# Patient Record
Sex: MALE | Race: WHITE | NOT HISPANIC OR LATINO | URBAN - METROPOLITAN AREA
[De-identification: names, ages, dates, MRNs, and addresses within clinical notes are randomized per-mention and may not be internally consistent; named-entity substitution may affect disease eponyms.]

---

## 2018-08-27 ENCOUNTER — OFFICE VISIT (OUTPATIENT)
Dept: URBAN - METROPOLITAN AREA CLINIC 23 | Facility: CLINIC | Age: 73
End: 2018-08-27
Payer: MEDICARE

## 2018-08-27 DIAGNOSIS — B02.39 OTHER HERPES ZOSTER EYE DISEASE: Primary | Chronic | ICD-10-CM

## 2018-08-27 DIAGNOSIS — H25.13 AGE-RELATED NUCLEAR CATARACT, BILATERAL: Chronic | ICD-10-CM

## 2018-08-27 PROCEDURE — 99213 OFFICE O/P EST LOW 20 MIN: CPT | Performed by: OPTOMETRIST

## 2018-08-27 ASSESSMENT — INTRAOCULAR PRESSURE
OD: 10
OS: 14

## 2018-08-27 NOTE — IMPRESSION/PLAN
Impression: Other herpes zoster eye disease: B02.39. Plan: Continue valacyclovir as prescribed by PCP; discussed new shingles vaccination; discussed if any pain on left eye to call and RTC to clinic immediately. Not likely as patient has been on oral antivirals for almost a week now, last day of treatment is tomorrow.

## 2020-05-06 ENCOUNTER — OFFICE VISIT (OUTPATIENT)
Dept: URBAN - METROPOLITAN AREA CLINIC 23 | Facility: CLINIC | Age: 75
End: 2020-05-06
Payer: MEDICARE

## 2020-05-06 DIAGNOSIS — H10.45 OTHER CHRONIC ALLERGIC CONJUNCTIVITIS: ICD-10-CM

## 2020-05-06 PROCEDURE — 92014 COMPRE OPH EXAM EST PT 1/>: CPT | Performed by: OPTOMETRIST

## 2020-05-06 ASSESSMENT — KERATOMETRY
OS: 44.13
OD: 44.13

## 2020-05-06 ASSESSMENT — VISUAL ACUITY
OD: 20/25
OS: 20/25

## 2020-05-06 ASSESSMENT — INTRAOCULAR PRESSURE
OD: 12
OS: 12

## 2020-05-06 NOTE — IMPRESSION/PLAN
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 Plan: Discussed findings. Recommend pt try Zaditor or Pataday OTC for allergies. Use AT during the day for comfort.

## 2020-05-06 NOTE — IMPRESSION/PLAN
Impression: Regular astigmatism, bilateral: H52.223. Plan: Discussed findings. New glasses Rx given. Recommend pt use cleaning cloth in glasses case to prevent scratches.

## 2020-05-06 NOTE — IMPRESSION/PLAN
Impression: Age-related nuclear cataract, bilateral: H25.13. Plan: Cataracts account for the patient's complaints. Medium. No treatment currently recommended. The patient will monitor vision changes and contact us with any decrease in vision. Recommend update glasses.

## 2021-06-29 ENCOUNTER — OFFICE VISIT (OUTPATIENT)
Dept: URBAN - METROPOLITAN AREA CLINIC 24 | Facility: CLINIC | Age: 76
End: 2021-06-29
Payer: MEDICARE

## 2021-06-29 DIAGNOSIS — H04.123 DRY EYE SYNDROME OF BILATERAL LACRIMAL GLANDS: Primary | ICD-10-CM

## 2021-06-29 DIAGNOSIS — H25.813 COMBINED FORMS OF AGE-RELATED CATARACT, BILATERAL: ICD-10-CM

## 2021-06-29 DIAGNOSIS — H52.223 REGULAR ASTIGMATISM, BILATERAL: ICD-10-CM

## 2021-06-29 PROCEDURE — 92134 CPTRZ OPH DX IMG PST SGM RTA: CPT | Performed by: OPTOMETRIST

## 2021-06-29 PROCEDURE — 92004 COMPRE OPH EXAM NEW PT 1/>: CPT | Performed by: OPTOMETRIST

## 2021-06-29 ASSESSMENT — VISUAL ACUITY
OS: 20/20
OD: 20/25

## 2021-06-29 ASSESSMENT — INTRAOCULAR PRESSURE
OD: 16
OS: 16

## 2021-06-29 ASSESSMENT — KERATOMETRY
OS: 44.41
OD: 44.73

## 2021-06-29 NOTE — IMPRESSION/PLAN
Impression: Dry eye syndrome of bilateral lacrimal glands, Blepharitis OU: W00.046. Plan: Explained condition to patient. Recommend warm compresses, lid scrubs, and artificial tears QID or more. Discussed benefits of Omega-3 FA's. Will monitor patient for now. RTC if no improvement or worsens.

## 2021-06-29 NOTE — IMPRESSION/PLAN
Impression: Regular astigmatism, bilateral: H52.223.  Plan: New srx released per pt request
Pt understands NI with new srx, NO RX CHECK

## 2022-04-28 ENCOUNTER — OFFICE VISIT (OUTPATIENT)
Dept: URBAN - METROPOLITAN AREA CLINIC 23 | Facility: CLINIC | Age: 77
End: 2022-04-28
Payer: MEDICARE

## 2022-04-28 DIAGNOSIS — H04.123 DRY EYE SYNDROME OF BILATERAL LACRIMAL GLANDS: Primary | ICD-10-CM

## 2022-04-28 DIAGNOSIS — H52.223 REGULAR ASTIGMATISM, BILATERAL: ICD-10-CM

## 2022-04-28 PROCEDURE — 99213 OFFICE O/P EST LOW 20 MIN: CPT | Performed by: STUDENT IN AN ORGANIZED HEALTH CARE EDUCATION/TRAINING PROGRAM

## 2022-04-28 ASSESSMENT — VISUAL ACUITY
OD: 20/25
OS: 20/20

## 2022-04-28 NOTE — IMPRESSION/PLAN
Impression: Dry eye syndrome of bilateral lacrimal glands H04.123. Blepharitis OU Allergic component Plan: Explained condition to patient. Recommend cont lid scrubs.  Increase art tears to qid OU and add OTC allergy gtts (rec pataday/zaditor)

## 2022-04-28 NOTE — IMPRESSION/PLAN
Impression: Regular astigmatism, bilateral: H52.223. Plan: Reassured pt rx made correctly and no changes in refraction today. 
Cont with current rx

## 2022-08-30 ENCOUNTER — OFFICE VISIT (OUTPATIENT)
Dept: URBAN - METROPOLITAN AREA CLINIC 23 | Facility: CLINIC | Age: 77
End: 2022-08-30
Payer: MEDICARE

## 2022-08-30 DIAGNOSIS — H04.221 EPIPHORA DUE TO INSUFFICIENT DRAINAGE, RIGHT LACRIMAL GLAND: Primary | ICD-10-CM

## 2022-08-30 PROCEDURE — 99214 OFFICE O/P EST MOD 30 MIN: CPT | Performed by: OPTOMETRIST

## 2022-08-30 RX ORDER — CIPROFLOXACIN HYDROCHLORIDE 3 MG/ML
0.3 % SOLUTION/ DROPS OPHTHALMIC
Qty: 5 | Refills: 0 | Status: ACTIVE
Start: 2022-08-30

## 2022-08-30 ASSESSMENT — INTRAOCULAR PRESSURE
OD: 14
OS: 14

## 2022-08-30 ASSESSMENT — KERATOMETRY
OD: 45.38
OS: 44.88

## 2022-08-30 NOTE — IMPRESSION/PLAN
Impression: Epiphora due to insufficient drainage, right lacrimal gland: X96.520. Right. Condition: will continue to monitor. Plan: Discussed findings. Advised patient of condition and treatment options. Prescribed Ciprofloxacin QID OD x 7 days. Recommend visit with oculoplastic specialist for possible dilation and irrigation.

## 2022-10-11 ENCOUNTER — OFFICE VISIT (OUTPATIENT)
Dept: URBAN - METROPOLITAN AREA CLINIC 23 | Facility: CLINIC | Age: 77
End: 2022-10-11
Payer: MEDICARE

## 2022-10-11 DIAGNOSIS — H04.123 DRY EYE SYNDROME OF BILATERAL LACRIMAL GLANDS: Primary | ICD-10-CM

## 2022-10-11 PROCEDURE — 99213 OFFICE O/P EST LOW 20 MIN: CPT | Performed by: OPTOMETRIST

## 2022-10-11 ASSESSMENT — INTRAOCULAR PRESSURE
OD: 18
OS: 18

## 2022-10-11 ASSESSMENT — KERATOMETRY
OS: 45.00
OD: 45.25

## 2022-10-11 NOTE — IMPRESSION/PLAN
Impression: Dry eye syndrome of bilateral lacrimal glands: H04.123. Bilateral. Condition: well controlled. Plan: Discussed findings. Epiphora resolved per pt. Continue use of OTC artificial tears BID OU for maintenance. Call with any worsening symptoms, return to annual exams.

## 2023-05-09 ENCOUNTER — OFFICE VISIT (OUTPATIENT)
Dept: URBAN - METROPOLITAN AREA CLINIC 23 | Facility: CLINIC | Age: 78
End: 2023-05-09
Payer: MEDICARE

## 2023-05-09 DIAGNOSIS — H04.223 EPIPHORA DUE TO INSUFFICIENT DRAINAGE, BILATERAL: ICD-10-CM

## 2023-05-09 DIAGNOSIS — H11.823 CONJUNCTIVOCHALASIS, BILATERAL: Primary | ICD-10-CM

## 2023-05-09 PROCEDURE — 99213 OFFICE O/P EST LOW 20 MIN: CPT | Performed by: OPTOMETRIST

## 2023-05-09 ASSESSMENT — INTRAOCULAR PRESSURE
OS: 13
OD: 14

## 2023-05-09 ASSESSMENT — KERATOMETRY
OS: 44.88
OD: 45.38

## 2023-05-09 NOTE — IMPRESSION/PLAN
Impression: Epiphora due to insufficient drainage, bilateral: U69.182. Bilateral. Condition: will continue to monitor. Plan: Consider possible dilation/irrigation of nasal-lacrimal duct if condition does not improve with use of Alrex.

## 2023-05-09 NOTE — IMPRESSION/PLAN
Impression: Conjunctivochalasis, bilateral: Q86.392. Bilateral. Condition: will continue to monitor. Plan: Discussed findings. Significant conjunctivochalasis noted. Gave sample of Alrex BID OU. Recommend consult with Dr. Chana Guthrie for possible treatment if condition does not improve.

## 2023-05-22 ENCOUNTER — OFFICE VISIT (OUTPATIENT)
Dept: URBAN - METROPOLITAN AREA CLINIC 23 | Facility: CLINIC | Age: 78
End: 2023-05-22
Payer: MEDICARE

## 2023-05-22 DIAGNOSIS — H11.823 CONJUNCTIVOCHALASIS, BILATERAL: Primary | ICD-10-CM

## 2023-05-22 PROCEDURE — 99203 OFFICE O/P NEW LOW 30 MIN: CPT | Performed by: OPHTHALMOLOGY

## 2023-05-22 RX ORDER — LOTEPREDNOL ETABONATE 2 MG/ML
0.2 % SUSPENSION/ DROPS OPHTHALMIC
Qty: 10 | Refills: 1 | Status: ACTIVE
Start: 2023-05-22

## 2023-05-22 ASSESSMENT — INTRAOCULAR PRESSURE
OD: 17
OS: 17

## 2023-05-22 NOTE — IMPRESSION/PLAN
Impression: Conjunctivochalasis, bilateral: T18.153 Bilateral. Plan: Irrigation of lower puncta OU. Alrex sent to pharmacy today.  RT 2-3 weeks F/U

## 2023-06-06 ENCOUNTER — OFFICE VISIT (OUTPATIENT)
Dept: URBAN - METROPOLITAN AREA CLINIC 23 | Facility: CLINIC | Age: 78
End: 2023-06-06
Payer: MEDICARE

## 2023-06-06 DIAGNOSIS — H04.223 EPIPHORA DUE TO INSUFFICIENT DRAINAGE, BILATERAL: Primary | ICD-10-CM

## 2023-06-06 PROCEDURE — 99213 OFFICE O/P EST LOW 20 MIN: CPT | Performed by: OPHTHALMOLOGY

## 2023-06-06 RX ORDER — LOTEPREDNOL ETABONATE 2 MG/ML
0.2 % SUSPENSION/ DROPS OPHTHALMIC
Qty: 5 | Refills: 3 | Status: ACTIVE
Start: 2023-06-06

## 2023-06-06 ASSESSMENT — INTRAOCULAR PRESSURE
OD: 18
OS: 18

## 2023-06-06 NOTE — IMPRESSION/PLAN
Impression: Epiphora due to insufficient drainage, bilateral: X51.388. Bilateral. Condition: will continue to monitor. Pt had dilation and irrigation previously Plan: Discussed diagnosis in detail with patient. Discussed treatment options with patient. Current therapy is best for patient. Continue using current medication(s). Alrex BID OU. Will continue to observe condition and or symptoms. Call if [address] worsens.

## 2023-07-17 ENCOUNTER — OFFICE VISIT (OUTPATIENT)
Dept: URBAN - METROPOLITAN AREA CLINIC 23 | Facility: CLINIC | Age: 78
End: 2023-07-17
Payer: MEDICARE

## 2023-07-17 DIAGNOSIS — H04.223 EPIPHORA DUE TO INSUFFICIENT DRAINAGE, BILATERAL: Primary | ICD-10-CM

## 2023-07-17 PROCEDURE — 99211 OFF/OP EST MAY X REQ PHY/QHP: CPT | Performed by: OPHTHALMOLOGY

## 2023-07-17 ASSESSMENT — INTRAOCULAR PRESSURE
OS: 18
OD: 18

## 2024-05-03 ENCOUNTER — OFFICE VISIT (OUTPATIENT)
Dept: URBAN - METROPOLITAN AREA CLINIC 23 | Facility: CLINIC | Age: 79
End: 2024-05-03
Payer: MEDICARE

## 2024-05-03 DIAGNOSIS — H16.223 KERATOCONJUNCTIVITIS SICCA, NOT SPECIFIED AS SJ”GREN'S, BILATERAL: ICD-10-CM

## 2024-05-03 DIAGNOSIS — H25.813 COMBINED FORMS OF AGE-RELATED CATARACT, BILATERAL: Primary | ICD-10-CM

## 2024-05-03 DIAGNOSIS — H52.223 REGULAR ASTIGMATISM, BILATERAL: ICD-10-CM

## 2024-05-03 PROCEDURE — 99213 OFFICE O/P EST LOW 20 MIN: CPT | Performed by: OPTOMETRIST

## 2024-05-03 ASSESSMENT — KERATOMETRY
OS: 45.00
OD: 45.25

## 2024-05-03 ASSESSMENT — INTRAOCULAR PRESSURE
OS: 17
OD: 17

## 2024-05-03 ASSESSMENT — VISUAL ACUITY
OD: 20/30
OS: 20/30

## 2024-08-06 ENCOUNTER — OFFICE VISIT (OUTPATIENT)
Dept: URBAN - METROPOLITAN AREA CLINIC 26 | Facility: CLINIC | Age: 79
End: 2024-08-06
Payer: MEDICARE

## 2024-08-06 DIAGNOSIS — H25.813 COMBINED FORMS OF AGE-RELATED CATARACT, BILATERAL: Primary | ICD-10-CM

## 2024-08-06 DIAGNOSIS — H04.123 DRY EYE SYNDROME OF BILATERAL LACRIMAL GLANDS: ICD-10-CM

## 2024-08-06 PROCEDURE — 92004 COMPRE OPH EXAM NEW PT 1/>: CPT | Performed by: OPTOMETRIST

## 2024-08-06 RX ORDER — LOTEPREDNOL ETABONATE 2 MG/ML
0.2 % SUSPENSION/ DROPS OPHTHALMIC
Qty: 5 | Refills: 2 | Status: ACTIVE
Start: 2024-08-06

## 2024-08-06 ASSESSMENT — VISUAL ACUITY
OD: 20/30
OS: 20/30

## 2024-08-06 ASSESSMENT — INTRAOCULAR PRESSURE
OS: 19
OD: 19

## 2025-02-12 ENCOUNTER — OFFICE VISIT (OUTPATIENT)
Dept: URBAN - METROPOLITAN AREA CLINIC 26 | Facility: CLINIC | Age: 80
End: 2025-02-12
Payer: MEDICARE

## 2025-02-12 DIAGNOSIS — H25.813 COMBINED FORMS OF AGE-RELATED CATARACT, BILATERAL: Primary | ICD-10-CM

## 2025-02-12 DIAGNOSIS — H52.4 PRESBYOPIA: ICD-10-CM

## 2025-02-12 DIAGNOSIS — H04.123 DRY EYE SYNDROME OF BILATERAL LACRIMAL GLANDS: ICD-10-CM

## 2025-02-12 PROCEDURE — 92014 COMPRE OPH EXAM EST PT 1/>: CPT | Performed by: OPTOMETRIST

## 2025-02-12 ASSESSMENT — KERATOMETRY
OD: 45.00
OS: 45.50

## 2025-02-12 ASSESSMENT — VISUAL ACUITY
OS: 20/25
OD: 20/30

## 2025-02-12 ASSESSMENT — INTRAOCULAR PRESSURE
OS: 17
OD: 17

## 2025-05-14 ENCOUNTER — TECH ONLY (OUTPATIENT)
Facility: LOCATION | Age: 80
End: 2025-05-14
Payer: MEDICARE

## 2025-05-14 ENCOUNTER — ADULT PHYSICAL (OUTPATIENT)
Facility: LOCATION | Age: 80
End: 2025-05-14
Payer: MEDICARE

## 2025-05-14 DIAGNOSIS — H25.813 COMBINED FORMS OF AGE-RELATED CATARACT, BILATERAL: Primary | ICD-10-CM

## 2025-05-14 DIAGNOSIS — Z01.818 ENCOUNTER FOR OTHER PREPROCEDURAL EXAMINATION: Primary | ICD-10-CM

## 2025-05-14 DIAGNOSIS — H25.13 AGE-RELATED NUCLEAR CATARACT, BILATERAL: ICD-10-CM

## 2025-05-14 PROCEDURE — 99203 OFFICE O/P NEW LOW 30 MIN: CPT

## 2025-05-28 ENCOUNTER — OFFICE VISIT (OUTPATIENT)
Dept: URBAN - METROPOLITAN AREA CLINIC 24 | Facility: CLINIC | Age: 80
End: 2025-05-28
Payer: MEDICARE

## 2025-05-28 DIAGNOSIS — H25.811 COMBINED FORMS OF AGE-RELATED CATARACT, RIGHT EYE: ICD-10-CM

## 2025-05-28 DIAGNOSIS — H04.123 DRY EYE SYNDROME OF BILATERAL LACRIMAL GLANDS: ICD-10-CM

## 2025-05-28 DIAGNOSIS — H25.813 COMBINED FORMS OF AGE-RELATED CATARACT, BILATERAL: Primary | ICD-10-CM

## 2025-05-28 DIAGNOSIS — H52.223 REGULAR ASTIGMATISM, BILATERAL: ICD-10-CM

## 2025-05-28 PROCEDURE — 99204 OFFICE O/P NEW MOD 45 MIN: CPT | Performed by: OPHTHALMOLOGY

## 2025-05-28 RX ORDER — OFLOXACIN 3 MG/ML
0.3 % SOLUTION/ DROPS OPHTHALMIC
Qty: 2.5 | Refills: 0 | Status: ACTIVE
Start: 2025-05-28

## 2025-05-28 ASSESSMENT — INTRAOCULAR PRESSURE
OS: 16
OD: 16

## 2025-06-09 ENCOUNTER — SURGERY (OUTPATIENT)
Facility: LOCATION | Age: 80
End: 2025-06-09
Payer: MEDICARE

## 2025-06-09 PROCEDURE — 66984 XCAPSL CTRC RMVL W/O ECP: CPT | Performed by: OPHTHALMOLOGY

## 2025-06-10 ENCOUNTER — POST-OPERATIVE VISIT (OUTPATIENT)
Dept: URBAN - METROPOLITAN AREA CLINIC 26 | Facility: CLINIC | Age: 80
End: 2025-06-10
Payer: MEDICARE

## 2025-06-10 DIAGNOSIS — Z48.810 ENCOUNTER FOR SURGICAL AFTERCARE FOLLOWING SURGERY ON A SENSE ORGAN: Primary | ICD-10-CM

## 2025-06-10 PROCEDURE — 99024 POSTOP FOLLOW-UP VISIT: CPT | Performed by: OPTOMETRIST

## 2025-06-10 ASSESSMENT — INTRAOCULAR PRESSURE: OS: 19

## 2025-06-19 ENCOUNTER — POST-OPERATIVE VISIT (OUTPATIENT)
Dept: URBAN - METROPOLITAN AREA CLINIC 26 | Facility: CLINIC | Age: 80
End: 2025-06-19
Payer: MEDICARE

## 2025-06-19 DIAGNOSIS — H52.4 PRESBYOPIA: Primary | ICD-10-CM

## 2025-06-19 DIAGNOSIS — Z48.810 ENCOUNTER FOR SURGICAL AFTERCARE FOLLOWING SURGERY ON A SENSE ORGAN: ICD-10-CM

## 2025-06-19 PROCEDURE — 99024 POSTOP FOLLOW-UP VISIT: CPT | Performed by: OPTOMETRIST

## 2025-06-19 ASSESSMENT — INTRAOCULAR PRESSURE
OD: 14
OS: 14

## 2025-06-19 ASSESSMENT — VISUAL ACUITY
OD: 20/30
OS: 20/20

## 2025-06-23 ENCOUNTER — SURGERY (OUTPATIENT)
Facility: LOCATION | Age: 80
End: 2025-06-23
Payer: MEDICARE

## 2025-06-23 PROCEDURE — 66984 XCAPSL CTRC RMVL W/O ECP: CPT | Performed by: OPHTHALMOLOGY

## 2025-06-24 ENCOUNTER — POST-OPERATIVE VISIT (OUTPATIENT)
Dept: URBAN - METROPOLITAN AREA CLINIC 26 | Facility: CLINIC | Age: 80
End: 2025-06-24
Payer: MEDICARE

## 2025-06-24 DIAGNOSIS — Z96.1 PRESENCE OF INTRAOCULAR LENS: Primary | ICD-10-CM

## 2025-06-24 PROCEDURE — 99024 POSTOP FOLLOW-UP VISIT: CPT | Performed by: OPTOMETRIST

## 2025-06-24 ASSESSMENT — INTRAOCULAR PRESSURE
OS: 15
OD: 14

## 2025-07-02 ENCOUNTER — POST-OPERATIVE VISIT (OUTPATIENT)
Dept: URBAN - METROPOLITAN AREA CLINIC 26 | Facility: CLINIC | Age: 80
End: 2025-07-02
Payer: MEDICARE

## 2025-07-02 DIAGNOSIS — Z96.1 PRESENCE OF INTRAOCULAR LENS: ICD-10-CM

## 2025-07-02 DIAGNOSIS — H52.4 PRESBYOPIA: Primary | ICD-10-CM

## 2025-07-02 PROCEDURE — 92015 DETERMINE REFRACTIVE STATE: CPT | Performed by: OPTOMETRIST

## 2025-07-02 PROCEDURE — 99024 POSTOP FOLLOW-UP VISIT: CPT | Performed by: OPTOMETRIST

## 2025-07-02 ASSESSMENT — VISUAL ACUITY
OD: 20/25
OS: 20/25

## 2025-07-02 ASSESSMENT — INTRAOCULAR PRESSURE
OD: 15
OS: 16

## 2025-07-09 ENCOUNTER — POST-OPERATIVE VISIT (OUTPATIENT)
Dept: URBAN - METROPOLITAN AREA CLINIC 26 | Facility: CLINIC | Age: 80
End: 2025-07-09
Payer: MEDICARE

## 2025-07-09 DIAGNOSIS — Z96.1 PRESENCE OF INTRAOCULAR LENS: Primary | ICD-10-CM

## 2025-07-09 PROCEDURE — 99024 POSTOP FOLLOW-UP VISIT: CPT | Performed by: OPTOMETRIST

## 2025-07-09 RX ORDER — FLUOROMETHOLONE 1 MG/ML
0.1 % SUSPENSION/ DROPS OPHTHALMIC
Qty: 5 | Refills: 1 | Status: ACTIVE
Start: 2025-07-09

## 2025-07-09 ASSESSMENT — KERATOMETRY
OD: 45.25
OS: 41.75

## 2025-07-09 ASSESSMENT — INTRAOCULAR PRESSURE
OS: 16
OD: 15

## 2025-07-23 ENCOUNTER — POST-OPERATIVE VISIT (OUTPATIENT)
Dept: URBAN - METROPOLITAN AREA CLINIC 26 | Facility: CLINIC | Age: 80
End: 2025-07-23
Payer: MEDICARE

## 2025-07-23 DIAGNOSIS — H52.4 PRESBYOPIA: Primary | ICD-10-CM

## 2025-07-23 DIAGNOSIS — Z96.1 PRESENCE OF INTRAOCULAR LENS: ICD-10-CM

## 2025-07-23 PROCEDURE — 99024 POSTOP FOLLOW-UP VISIT: CPT | Performed by: OPTOMETRIST

## 2025-07-23 ASSESSMENT — VISUAL ACUITY
OD: 20/20
OS: 20/20

## 2025-07-23 ASSESSMENT — INTRAOCULAR PRESSURE
OS: 15
OD: 15